# Patient Record
Sex: MALE | Race: WHITE | URBAN - METROPOLITAN AREA
[De-identification: names, ages, dates, MRNs, and addresses within clinical notes are randomized per-mention and may not be internally consistent; named-entity substitution may affect disease eponyms.]

---

## 2023-02-21 ENCOUNTER — OFFICE (OUTPATIENT)
Dept: URBAN - METROPOLITAN AREA CLINIC 104 | Facility: CLINIC | Age: 53
Setting detail: OPHTHALMOLOGY
End: 2023-02-21
Payer: COMMERCIAL

## 2023-02-21 DIAGNOSIS — Z01.11: ICD-10-CM

## 2023-02-21 PROCEDURE — 90002 FAA EXAMINATION: CPT | Performed by: SPECIALIST

## 2023-02-21 ASSESSMENT — VISUAL ACUITY
OD_BCVA: 20/20
OS_BCVA: 20/20

## 2023-02-21 ASSESSMENT — REFRACTION_CURRENTRX
OS_OVR_VA: 20/
OD_OVR_VA: 20/

## 2023-08-25 ENCOUNTER — APPOINTMENT (OUTPATIENT)
Dept: ORTHOPEDIC SURGERY | Facility: CLINIC | Age: 53
End: 2023-08-25
Payer: COMMERCIAL

## 2023-08-25 VITALS — HEIGHT: 67 IN | WEIGHT: 155 LBS | BODY MASS INDEX: 24.33 KG/M2

## 2023-08-25 DIAGNOSIS — M47.817 SPONDYLOSIS W/OUT MYELOPATHY OR RADICULOPATHY, LUMBOSACRAL REGION: ICD-10-CM

## 2023-08-25 DIAGNOSIS — Z78.9 OTHER SPECIFIED HEALTH STATUS: ICD-10-CM

## 2023-08-25 DIAGNOSIS — M48.061 SPINAL STENOSIS, LUMBAR REGION WITHOUT NEUROGENIC CLAUDICATION: ICD-10-CM

## 2023-08-25 DIAGNOSIS — M51.36 OTHER INTERVERTEBRAL DISC DEGENERATION, LUMBAR REGION: ICD-10-CM

## 2023-08-25 DIAGNOSIS — M51.37 OTHER INTERVERTEBRAL DISC DEGENERATION, LUMBOSACRAL REGION: ICD-10-CM

## 2023-08-25 PROBLEM — Z00.00 ENCOUNTER FOR PREVENTIVE HEALTH EXAMINATION: Status: ACTIVE | Noted: 2023-08-25

## 2023-08-25 PROCEDURE — 99204 OFFICE O/P NEW MOD 45 MIN: CPT

## 2023-08-25 PROCEDURE — 72100 X-RAY EXAM L-S SPINE 2/3 VWS: CPT

## 2023-08-25 NOTE — ASSESSMENT
[FreeTextEntry1] : Patient given prescription for EMG/NCS, follow up after study is completed to discuss results.   Patient will begin physical therapy.   Recommend: - NSAID - Heating pad - Muscle relaxer - Core strengthening exercise - Hamstring stretching exercise Patient is given back rehabilitation exercise book.

## 2023-08-25 NOTE — PHYSICAL EXAM
[TextEntry] : Constitutional: Well groomed and developed.  Respiratory: Normal, unlabored breathing. No use of accessory muscles.  Skin: No rashes or ulcers. Skin warm and dry.  Psychiatric: Oriented to time, place, person and event. No acute distress. Gait: Heel to toe Patient able to walk on toes and heels.    Lumbar spine:  Posture: Normal on coronal and sagittal alignment  AROM:  Flexion 90  Extension 10  Moderate pain with simulated truncal motion   Tenderness:  Thoracic: No tenderness on palpation  Lumbar: Moderate tenderness on palpation  Sacrum/coccyx: no tenderness on palpation  Greater trochanteric bursa:  No tenderness  PSIS: None    Motor:                         R             L LE:                                IS                    5             5 Quad              5             5 TA                  5             5 EHL                5             5 Gastroc         5             5                 R  L DTR: Patella  2+  2+ Achilles  2+  2+  Sensory: Light touch sensation intact T12-S1  Babinski's Sign: Negative bilaterally  Straight leg raise test: Negative bilaterally  ALISSON test: negative bilaterally 
EOMI; PERRL; no drainage or redness

## 2023-08-25 NOTE — HISTORY OF PRESENT ILLNESS
[Lower back] : lower back [Gradual] : gradual [0] : 0 [9] : 9 [Sharp] : sharp [Intermittent] : intermittent [Sleep] : sleep [Walking/activity] : walking/activity [Lying in bed] : lying in bed [Full time] : Work status: full time [de-identified] : Patient presents today with lower back pain for 6 months with NKI. Patient denies previous treatments. Patient states his pain radiates down bilateral legs, denies numbness/tingling. Patient denies taking any pain medication. Patient denies recent imaging.  [] : no [de-identified] :

## 2023-08-29 ENCOUNTER — APPOINTMENT (OUTPATIENT)
Dept: NEUROLOGY | Facility: CLINIC | Age: 53
End: 2023-08-29
Payer: COMMERCIAL

## 2023-08-29 PROCEDURE — 95911 NRV CNDJ TEST 9-10 STUDIES: CPT

## 2023-08-29 PROCEDURE — 95886 MUSC TEST DONE W/N TEST COMP: CPT

## 2023-09-21 ENCOUNTER — APPOINTMENT (OUTPATIENT)
Dept: ORTHOPEDIC SURGERY | Facility: CLINIC | Age: 53
End: 2023-09-21

## 2023-10-18 ENCOUNTER — APPOINTMENT (OUTPATIENT)
Dept: ORTHOPEDIC SURGERY | Facility: CLINIC | Age: 53
End: 2023-10-18

## 2023-10-18 VITALS — BODY MASS INDEX: 24.33 KG/M2 | WEIGHT: 155 LBS | HEIGHT: 67 IN

## 2023-11-01 ENCOUNTER — APPOINTMENT (OUTPATIENT)
Dept: ORTHOPEDIC SURGERY | Facility: CLINIC | Age: 53
End: 2023-11-01